# Patient Record
Sex: FEMALE | Race: BLACK OR AFRICAN AMERICAN | NOT HISPANIC OR LATINO | URBAN - METROPOLITAN AREA
[De-identification: names, ages, dates, MRNs, and addresses within clinical notes are randomized per-mention and may not be internally consistent; named-entity substitution may affect disease eponyms.]

---

## 2017-01-09 ENCOUNTER — EMERGENCY (EMERGENCY)
Facility: HOSPITAL | Age: 37
LOS: 1 days | Discharge: HOME | End: 2017-01-09
Attending: EMERGENCY MEDICINE | Admitting: EMERGENCY MEDICINE
Payer: COMMERCIAL

## 2017-01-09 VITALS
OXYGEN SATURATION: 100 % | DIASTOLIC BLOOD PRESSURE: 72 MMHG | SYSTOLIC BLOOD PRESSURE: 188 MMHG | HEART RATE: 58 BPM | RESPIRATION RATE: 16 BRPM

## 2017-01-09 VITALS
DIASTOLIC BLOOD PRESSURE: 129 MMHG | HEIGHT: 67 IN | HEART RATE: 75 BPM | WEIGHT: 214.95 LBS | SYSTOLIC BLOOD PRESSURE: 200 MMHG | RESPIRATION RATE: 18 BRPM | TEMPERATURE: 98 F | OXYGEN SATURATION: 100 %

## 2017-01-09 DIAGNOSIS — Z98.89 OTHER SPECIFIED POSTPROCEDURAL STATES: Chronic | ICD-10-CM

## 2017-01-09 PROCEDURE — 99284 EMERGENCY DEPT VISIT MOD MDM: CPT | Mod: 25

## 2017-01-09 PROCEDURE — 93971 EXTREMITY STUDY: CPT | Mod: 26,LT

## 2017-01-09 NOTE — ED ADULT NURSE NOTE - CHIEF COMPLAINT QUOTE
Pt walk in c/o Pain at the Popliteal area radiates down to foot and Numbness on Smallest toes for a week. Denies HA Dizziness SOB Palpitation CP. Seen at Urgent Care r/o DVT. On Amlodipine and Labetalol claimed not taking it regularly.

## 2017-01-09 NOTE — ED ADULT TRIAGE NOTE - CHIEF COMPLAINT QUOTE
Pt walk in c/o Pain at the Popliteal area radiates down to foot and Numbness on Smallest toes for a week. Denies HA Dizziness SOB Palpitation CP. Seen at [facility] r/o DVT Pt walk in c/o Pain at the Popliteal area radiates down to foot and Numbness on Smallest toes for a week. Denies HA Dizziness SOB Palpitation CP. Seen at Urgent Care r/o DVT. Pt walk in c/o Pain at the Popliteal area radiates down to foot and Numbness on Smallest toes for a week. Denies HA Dizziness SOB Palpitation CP. Seen at Urgent Care r/o DVT. On Amlodipine and Labetalol claimed not taking it regularly.

## 2017-01-09 NOTE — ED PROVIDER NOTE - OBJECTIVE STATEMENT
37yo female h/o htn, 8 weeks post partum, p/w right posterior leg pain, worse with movement. NO h/o DVT, no chest pain, no SOB. no leg swelling. No trauma to leg.  Also noted to be hypertensive in triage, pt ran out of nifedipine, just got refill. NO headaches, chest pain, vision changes

## 2017-01-09 NOTE — ED ADULT NURSE NOTE - OBJECTIVE STATEMENT
Received pt to spot 27 A&Ox4 c/o right lower leg pain worse with movement x 1 week. Pt also reports HTN @ home x 1 week r/t noncompliant with medications, reports running out of meds. VS as noted, MD at bedside, will reassess.